# Patient Record
Sex: FEMALE | Race: WHITE
[De-identification: names, ages, dates, MRNs, and addresses within clinical notes are randomized per-mention and may not be internally consistent; named-entity substitution may affect disease eponyms.]

---

## 2017-07-08 ENCOUNTER — HOSPITAL ENCOUNTER (EMERGENCY)
Dept: HOSPITAL 57 - BURERS | Age: 27
Discharge: HOME | End: 2017-07-08
Payer: SELF-PAY

## 2017-07-08 DIAGNOSIS — R11.2: ICD-10-CM

## 2017-07-08 DIAGNOSIS — F32.9: ICD-10-CM

## 2017-07-08 DIAGNOSIS — E86.0: Primary | ICD-10-CM

## 2017-07-08 DIAGNOSIS — R19.7: ICD-10-CM

## 2017-07-08 LAB
ALBUMIN SERPL BCG-MCNC: 4.4 G/DL (ref 3.5–5)
ALP SERPL-CCNC: 89 U/L (ref 40–150)
ALT SERPL W P-5'-P-CCNC: 33 U/L (ref 8–55)
ANION GAP SERPL CALC-SCNC: 15 MMOL/L (ref 10–20)
AST SERPL-CCNC: 19 U/L (ref 5–34)
BACTERIA UR QL AUTO: (no result) HPF
BASOPHILS # BLD AUTO: 0.1 THOU/UL (ref 0–0.2)
BASOPHILS NFR BLD AUTO: 1 % (ref 0–1)
BILIRUB SERPL-MCNC: 0.5 MG/DL (ref 0.2–1.2)
BUN SERPL-MCNC: 12 MG/DL (ref 7–18.7)
CALCIUM SERPL-MCNC: 9.1 MG/DL (ref 7.8–10.44)
CHLORIDE SERPL-SCNC: 102 MMOL/L (ref 98–107)
CO2 SERPL-SCNC: 25 MMOL/L (ref 22–29)
CREAT CL PREDICTED SERPL C-G-VRATE: 0 ML/MIN (ref 70–130)
EOSINOPHIL # BLD AUTO: 0.2 THOU/UL (ref 0–0.7)
EOSINOPHIL NFR BLD AUTO: 3.3 % (ref 0–10)
GLOBULIN SER CALC-MCNC: 3.3 G/DL (ref 2.4–3.5)
GLUCOSE SERPL-MCNC: 98 MG/DL (ref 70–105)
HGB BLD-MCNC: 15.4 G/DL (ref 12–16)
LIPASE SERPL-CCNC: 30 U/L (ref 8–78)
LYMPHOCYTES # BLD AUTO: 1.1 THOU/UL (ref 1.2–3.4)
LYMPHOCYTES NFR BLD AUTO: 21.5 % (ref 21–51)
MCH RBC QN AUTO: 30.5 PG (ref 27–31)
MCV RBC AUTO: 89.4 FL (ref 81–99)
MONOCYTES # BLD AUTO: 0.7 THOU/UL (ref 0.11–0.59)
MONOCYTES NFR BLD AUTO: 12.5 % (ref 0–10)
NEUTROPHILS # BLD AUTO: 3.3 THOU/UL (ref 1.4–6.5)
NEUTROPHILS NFR BLD AUTO: 61.7 % (ref 42–75)
PLATELET # BLD AUTO: 203 THOU/UL (ref 130–400)
POTASSIUM SERPL-SCNC: 3.7 MMOL/L (ref 3.5–5.1)
PROT UR STRIP.AUTO-MCNC: 30 MG/DL
RBC # BLD AUTO: 5.03 MILL/UL (ref 4.2–5.4)
RBC UR QL AUTO: (no result) HPF (ref 0–3)
SODIUM SERPL-SCNC: 138 MMOL/L (ref 136–145)
SP GR UR STRIP: 1.02 (ref 1–1.03)
WBC # BLD AUTO: 5.3 THOU/UL (ref 4.8–10.8)
WBC UR QL AUTO: (no result) HPF (ref 0–3)

## 2017-07-08 PROCEDURE — 81003 URINALYSIS AUTO W/O SCOPE: CPT

## 2017-07-08 PROCEDURE — 96374 THER/PROPH/DIAG INJ IV PUSH: CPT

## 2017-07-08 PROCEDURE — 85025 COMPLETE CBC W/AUTO DIFF WBC: CPT

## 2017-07-08 PROCEDURE — 81015 MICROSCOPIC EXAM OF URINE: CPT

## 2017-07-08 PROCEDURE — 36415 COLL VENOUS BLD VENIPUNCTURE: CPT

## 2017-07-08 PROCEDURE — 83690 ASSAY OF LIPASE: CPT

## 2017-07-08 PROCEDURE — 80053 COMPREHEN METABOLIC PANEL: CPT

## 2017-07-08 PROCEDURE — 96375 TX/PRO/DX INJ NEW DRUG ADDON: CPT

## 2017-09-11 ENCOUNTER — HOSPITAL ENCOUNTER (EMERGENCY)
Dept: HOSPITAL 57 - BURERS | Age: 27
Discharge: HOME | End: 2017-09-11
Payer: SELF-PAY

## 2017-09-11 DIAGNOSIS — N76.0: Primary | ICD-10-CM

## 2017-09-11 DIAGNOSIS — F32.9: ICD-10-CM

## 2017-09-11 DIAGNOSIS — Z97.5: ICD-10-CM

## 2017-09-11 LAB
PREGU CONTROL BACKGROUND?: (no result)
PREGU CONTROL BAR APPEAR?: YES
SP GR UR STRIP: 1.02 (ref 1–1.03)

## 2017-09-11 PROCEDURE — 87591 N.GONORRHOEAE DNA AMP PROB: CPT

## 2017-09-11 PROCEDURE — 87510 GARDNER VAG DNA DIR PROBE: CPT

## 2017-09-11 PROCEDURE — 87491 CHLMYD TRACH DNA AMP PROBE: CPT

## 2017-09-11 PROCEDURE — 87480 CANDIDA DNA DIR PROBE: CPT

## 2017-09-11 PROCEDURE — 81003 URINALYSIS AUTO W/O SCOPE: CPT

## 2017-09-11 PROCEDURE — 87660 TRICHOMONAS VAGIN DIR PROBE: CPT

## 2017-09-11 PROCEDURE — 81025 URINE PREGNANCY TEST: CPT

## 2017-09-11 PROCEDURE — 96372 THER/PROPH/DIAG INJ SC/IM: CPT

## 2017-09-11 PROCEDURE — 74020: CPT

## 2017-09-11 NOTE — RAD
ABDOMEN TWO VIEWS:

 

Date: 9-11-17

 

FINDINGS: 

Supine and erect films show no free air beneath the diaphragm. The gas pattern is normal with no sig
n of obstruction. The bones and soft tissue showed no acute changes. At most, there is some mild inc
rease in fecal material in the colon. No pathologic calcifications were detected. An IUD was noted i
n the midline of the pelvis. 

 

IMPRESSION: 

No acute abdominal finding.

 

POS: HOME

## 2017-09-26 ENCOUNTER — HOSPITAL ENCOUNTER (EMERGENCY)
Dept: HOSPITAL 57 - BURERS | Age: 27
Discharge: HOME | End: 2017-09-26
Payer: SELF-PAY

## 2017-09-26 DIAGNOSIS — J06.9: Primary | ICD-10-CM

## 2017-09-26 DIAGNOSIS — F32.9: ICD-10-CM

## 2017-09-26 PROCEDURE — 99282 EMERGENCY DEPT VISIT SF MDM: CPT

## 2017-12-14 ENCOUNTER — HOSPITAL ENCOUNTER (EMERGENCY)
Dept: HOSPITAL 57 - BURERS | Age: 27
Discharge: HOME | End: 2017-12-14
Payer: SELF-PAY

## 2017-12-14 DIAGNOSIS — J11.1: Primary | ICD-10-CM

## 2017-12-14 DIAGNOSIS — F32.9: ICD-10-CM

## 2017-12-14 PROCEDURE — 99283 EMERGENCY DEPT VISIT LOW MDM: CPT

## 2018-08-20 ENCOUNTER — HOSPITAL ENCOUNTER (EMERGENCY)
Dept: HOSPITAL 57 - BURERS | Age: 28
Discharge: HOME | End: 2018-08-20
Payer: SELF-PAY

## 2018-08-20 DIAGNOSIS — F32.9: ICD-10-CM
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
## 2018-08-20 DIAGNOSIS — S20.211A: Primary | ICD-10-CM

## 2018-08-20 PROCEDURE — 81003 URINALYSIS AUTO W/O SCOPE: CPT

## 2018-08-20 PROCEDURE — 96372 THER/PROPH/DIAG INJ SC/IM: CPT

## 2018-08-20 PROCEDURE — 81025 URINE PREGNANCY TEST: CPT

## 2018-08-20 NOTE — RAD
RIGHT RIBS WITH PA CHEST:

 

08/20/2018

 

FINDINGS:

All ribs appear intact.  No fractures are appreciated.  The heart is normal in size, and the lungs ar
e clear.  There is no sign of infiltrate, effusion, or pneumothorax.  The mediastinum appears normal,
 and the trachea is midline.

 

IMPRESSION:

No acute thoracic findings.

 

POS: HOME

## 2019-02-19 ENCOUNTER — HOSPITAL ENCOUNTER (EMERGENCY)
Dept: HOSPITAL 57 - BURERS | Age: 29
Discharge: HOME | End: 2019-02-19
Payer: SELF-PAY

## 2019-02-19 DIAGNOSIS — F32.9: ICD-10-CM

## 2019-02-19 DIAGNOSIS — J11.1: Primary | ICD-10-CM

## 2019-02-19 PROCEDURE — 99281 EMR DPT VST MAYX REQ PHY/QHP: CPT

## 2019-11-08 ENCOUNTER — HOSPITAL ENCOUNTER (OUTPATIENT)
Dept: HOSPITAL 92 - BICMAMMO | Age: 29
Discharge: HOME | End: 2019-11-08
Attending: NURSE PRACTITIONER
Payer: MEDICAID

## 2019-11-08 DIAGNOSIS — N63.10: Primary | ICD-10-CM

## 2019-11-08 PROCEDURE — G0279 TOMOSYNTHESIS, MAMMO: HCPCS

## 2019-11-08 PROCEDURE — 77066 DX MAMMO INCL CAD BI: CPT

## 2019-11-08 NOTE — ULT
LIMITED RIGHT BREAST ULTRASOUND:

 

Date:  11/08/19 

 

PROVIDED CLINICAL HISTORY:   

Right breast palpable abnormality and bloody right nipple discharge. 

 

FINDINGS:

Limited sonographic interrogation of the right breast in the region of palpable concern demonstrates 
a small simple cyst measuring about 3 mm. 

 

Limited sonographic interrogation of the retroareolar region demonstrates no evidence for intraductal
 mass. 

 

IMPRESSION: 

BI-RADS Category 2 - Benign findings. No imaging findings to explain the patient's bloody nipple disc
harge. Surgical consultation should be considered. 

 

 

POS: OFF

## 2019-11-08 NOTE — MMO
Bilateral MAMMO Bilat Diag [facility].

 

CLINICAL HISTORY:

Patient is 29 years old and is seen for diagnostic exam,lump or thickening and

bloody discharge in the right breast. The patient has the following family

history of breast cancer:  maternal aunt, at age 37, malignant (generic) and

cousin female, at age 35, malignant (generic).  The patient has no personal

history of cancer.

 

VIEWS:

The views performed were:  bilateral craniocaudal with tomosynthesis; bilateral

mediolateral oblique with tomosynthesis; and bilateral mediolateral with

tomosynthesis.

 

FILMS COMPARED:

The present examination has been compared to a prior imaging study performed at

[facility] on 11/08/2019.

 

This study has been interpreted with the assistance of computer-aided detection.

 

MAMMOGRAM FINDINGS:

The breasts are heterogeneously dense, which could obscure a lesion on

mammography.

 

Finding 1:

 

There are no mammographic abnormalities in the area of palpable concern.  A

simple cyst is seen in this region sonographically.

 

Finding 2:

 

There are no mammographic or sonographic abnormalities to explain the patient's

reported  bloody right nipple discharge.  The patient is referred back to her

clinician.  Negative imaging findings should not preclude further evaluation if

clinical findings are suspicious.

 

There are no suspicious masses, suspicious calcifications, or areas of

architectural distortion.

 

IMPRESSION:

THERE ARE NO MAMMOGRAPHIC ABNORMALITIES TO EXPLAIN THE

PATIENT'S REPORTED BLOODY RIGHT NIPPLE DISCHARGE. SURGICAL CONSULTATION SHOULD

BE CONSIDERED.

 

THE RESULTS OF THIS EXAM WERE SENT TO THE PATIENT.

 

ACR BI-RADS Category 2 - Benign finding

 

MAMMOGRAPHY NOTE:

1. A negative mammogram report should not delay a biopsy if a dominant of

clinically suspicious mass is present.

2. Approximately 10% to 15% of breast cancers are not detected by

mammography.

3. Adenosis and dense breasts may obscure an underlying neoplasm.

 

 

Reported by: RIKI FAN MD

Electonically Signed: [id number]